# Patient Record
Sex: MALE | Race: WHITE | URBAN - METROPOLITAN AREA
[De-identification: names, ages, dates, MRNs, and addresses within clinical notes are randomized per-mention and may not be internally consistent; named-entity substitution may affect disease eponyms.]

---

## 2024-01-04 ENCOUNTER — OFFICE VISIT (OUTPATIENT)
Dept: URGENT CARE | Facility: CLINIC | Age: 39
End: 2024-01-04
Payer: COMMERCIAL

## 2024-01-04 VITALS
BODY MASS INDEX: 44.1 KG/M2 | OXYGEN SATURATION: 98 % | HEIGHT: 71 IN | RESPIRATION RATE: 16 BRPM | SYSTOLIC BLOOD PRESSURE: 138 MMHG | HEART RATE: 90 BPM | WEIGHT: 315 LBS | DIASTOLIC BLOOD PRESSURE: 89 MMHG | TEMPERATURE: 98.6 F

## 2024-01-04 DIAGNOSIS — R05.1 ACUTE COUGH: ICD-10-CM

## 2024-01-04 DIAGNOSIS — J06.9 ACUTE URI: Primary | ICD-10-CM

## 2024-01-04 PROCEDURE — 99212 OFFICE O/P EST SF 10 MIN: CPT | Mod: 25

## 2024-01-04 RX ORDER — BUPROPION HYDROCHLORIDE 150 MG/1
150 TABLET, EXTENDED RELEASE ORAL 2 TIMES DAILY
COMMUNITY
Start: 2023-06-07

## 2024-01-04 RX ORDER — AZITHROMYCIN 250 MG/1
TABLET, FILM COATED ORAL
Qty: 6 TABLET | Refills: 0 | Status: SHIPPED | OUTPATIENT
Start: 2024-01-04 | End: 2024-01-09

## 2024-01-04 RX ORDER — FLUOXETINE HYDROCHLORIDE 20 MG/1
40 CAPSULE ORAL
COMMUNITY
Start: 2022-06-06

## 2024-01-04 RX ORDER — PREDNISONE 10 MG/1
30 TABLET ORAL DAILY
Qty: 21 TABLET | Refills: 0 | Status: SHIPPED | OUTPATIENT
Start: 2024-01-04 | End: 2024-01-11

## 2024-01-04 NOTE — PROGRESS NOTES
[facility] URGENT CARE MADELINE NOTE:      Name: Jaswant Carter, 38 y.o.    CSN:[id number]   MRN:[id number]    PCP: No primary care provider on file.    ALL:  No Known Allergies    History:    Chief Complaint: Cough (COUGH FOR 1 WEEK)    Encounter Date: 1/4/2024      HPI: The history was obtained from the patient. Jaswant is a 38 y.o. male, who presents with a chief complaint of Cough (COUGH FOR 1 WEEK). States he went to urgent care on 12/31/23 and was given citirizine and and flonase. He felt like his symptoms improved for a couple days and on Wednesday woke up with congestion, cough, and sore throat which was worse than the initial bout. He denies fevers, N/V, headache, sob, chest pain, abdominal pain.    PMHx:    No past medical history on file.           Current Outpatient Medications   Medication Sig Dispense Refill    buPROPion SR (Wellbutrin SR) 150 mg 12 hr tablet Take 1 tablet (150 mg) by mouth twice a day.      FLUoxetine (PROzac) 20 mg capsule Take 2 capsules (40 mg) by mouth once daily.      azithromycin (Zithromax Z-Marvin) 250 mg tablet Take 2 tablets (500 mg) on  Day 1,  followed by 1 tablet (250 mg) once daily on Days 2 through 5. 6 tablet 0    predniSONE (Deltasone) 10 mg tablet Take 3 tablets (30 mg) by mouth once daily for 7 days. 21 tablet 0     No current facility-administered medications for this visit.         PMSx:  No past surgical history on file.    Fam Hx: No family history on file.    SOC. Hx:     Social History     Socioeconomic History    Marital status: Single     Spouse name: Not on file    Number of children: Not on file    Years of education: Not on file    Highest education level: Not on file   Occupational History    Not on file   Tobacco Use    Smoking status: Not on file    Smokeless tobacco: Not on file   Substance and Sexual Activity    Alcohol use: Not on file    Drug use: Not on file    Sexual activity: Not on file   Other Topics Concern    Not on file    Social History Narrative    Not on file     Social Determinants of Health     Financial Resource Strain: Not on file   Food Insecurity: Not on file   Transportation Needs: Not on file   Physical Activity: Not on file   Stress: Not on file   Social Connections: Not on file   Intimate Partner Violence: Not on file   Housing Stability: Not on file         Vitals:    01/04/24 1740   BP: 138/89   Pulse: 90   Resp: 16   Temp: 37 °C (98.6 °F)   SpO2: 98%     145 kg (320 lb)          Physical Exam  Constitutional:       Appearance: Normal appearance.   HENT:      Right Ear: Tympanic membrane normal.      Left Ear: Tympanic membrane normal.      Nose: Congestion present.      Mouth/Throat:      Mouth: Mucous membranes are moist.      Pharynx: Oropharynx is clear. Posterior oropharyngeal erythema present.   Eyes:      Conjunctiva/sclera: Conjunctivae normal.      Pupils: Pupils are equal, round, and reactive to light.   Cardiovascular:      Rate and Rhythm: Normal rate and regular rhythm.   Pulmonary:      Effort: Pulmonary effort is normal.      Breath sounds: Normal breath sounds.   Skin:     General: Skin is warm.   Neurological:      General: No focal deficit present.      Mental Status: He is alert and oriented to person, place, and time.   Psychiatric:         Mood and Affect: Mood normal.         Behavior: Behavior normal.         LABORATORY @ RADIOLOGICAL IMAGING (if done):     No results found for this or any previous visit (from the past 24 hour(s)).    UC COURSE/MEDICAL DECISION MAKING:    Jaswant is a 38 y.o., who presents with a working diagnosis of   1. Acute URI    2. Acute cough      Jaswant was seen today for cough.  Diagnoses and all orders for this visit:  Acute URI (Primary)  -     azithromycin (Zithromax Z-Marvin) 250 mg tablet; Take 2 tablets (500 mg) on  Day 1,  followed by 1 tablet (250 mg) once daily on Days 2 through 5.  Acute cough  -     predniSONE (Deltasone) 10 mg tablet; Take 3 tablets (30 mg) by mouth  once daily for 7 days.    After my independent evaluation, he appears to have a illness likely due to a URI. At this time, there is a no evidence of pneumonia, hypoxia, OM, bacterial bronchitis, bacteremia, or sepsis.     In my medical opinion, I consider Jaswant to be low risk for any serious/life-threatening entity, and deem him stable for discharge. This is based on the information that was available to me at the time of this visit.      As we discussed, he is to take the antibiotic as directed. Will also send in steroid burst for cough. He is to return to our office or ER immediately if there is any worsening of his condition, such as increased cough, shortness of breath, persistent fevers, repeated vomiting, dehydration, or if his condition worsens at all.      Katharine Willoughby PA-C   Advanced Practice Provider  [facility] URGENT CARE

## 2024-12-28 ENCOUNTER — OFFICE VISIT (OUTPATIENT)
Dept: URGENT CARE | Facility: CLINIC | Age: 39
End: 2024-12-28
Payer: COMMERCIAL

## 2024-12-28 VITALS
OXYGEN SATURATION: 97 % | BODY MASS INDEX: 32.9 KG/M2 | HEART RATE: 86 BPM | HEIGHT: 71 IN | RESPIRATION RATE: 16 BRPM | WEIGHT: 235 LBS | SYSTOLIC BLOOD PRESSURE: 124 MMHG | DIASTOLIC BLOOD PRESSURE: 78 MMHG | TEMPERATURE: 98.2 F

## 2024-12-28 DIAGNOSIS — R05.1 ACUTE COUGH: ICD-10-CM

## 2024-12-28 DIAGNOSIS — J10.1 INFLUENZA A: Primary | ICD-10-CM

## 2024-12-28 LAB
POC CORONAVIRUS 2019 BY PCR (COV19): NOT DETECTED
POC FLU A RESULT: DETECTED
POC FLU B RESULT: NOT DETECTED
POC RSV PCR: NOT DETECTED

## 2024-12-28 PROCEDURE — 99213 OFFICE O/P EST LOW 20 MIN: CPT | Performed by: NURSE PRACTITIONER

## 2024-12-28 RX ORDER — GUAIFENESIN, PSEUDOEPHEDRINE HYDROCHLORIDE 600; 60 MG/1; MG/1
1 TABLET, EXTENDED RELEASE ORAL EVERY 12 HOURS
Qty: 28 TABLET | Refills: 0 | Status: SHIPPED | OUTPATIENT
Start: 2024-12-28 | End: 2025-01-11

## 2024-12-28 RX ORDER — ALBUTEROL SULFATE 90 UG/1
2 INHALANT RESPIRATORY (INHALATION) EVERY 4 HOURS PRN
Qty: 18 G | Refills: 0 | Status: SHIPPED | OUTPATIENT
Start: 2024-12-28 | End: 2025-01-27

## 2024-12-28 RX ORDER — OSELTAMIVIR PHOSPHATE 75 MG/1
75 CAPSULE ORAL EVERY 12 HOURS
Qty: 10 CAPSULE | Refills: 0 | Status: SHIPPED | OUTPATIENT
Start: 2024-12-28 | End: 2025-01-02

## 2024-12-28 RX ORDER — BROMPHENIRAMINE MALEATE, PSEUDOEPHEDRINE HYDROCHLORIDE, AND DEXTROMETHORPHAN HYDROBROMIDE 2; 30; 10 MG/5ML; MG/5ML; MG/5ML
5 SYRUP ORAL 4 TIMES DAILY PRN
Qty: 120 ML | Refills: 0 | Status: SHIPPED | OUTPATIENT
Start: 2024-12-28 | End: 2025-01-07

## 2024-12-28 NOTE — LETTER
December 28, 2024     Patient: Jaswant Carter   YOB: 1985   Date of Visit: 12/28/2024       To Whom It May Concern:    It is my medical opinion that Jaswant Carter may return to work on 12/31/2024 for influenza A .    If you have any questions or concerns, please don't hesitate to call.         Sincerely,        SHANDA Xavier-CNP    CC: No Recipients

## 2024-12-28 NOTE — PROGRESS NOTES
[facility] URGENT CARE   MADELINE NOTE:      Name: Jaswant Carter, 39 y.o.    CSN:[id number]   MRN:[id number]    PCP: No primary care provider on file.    ALL:  No Known Allergies    History:    Chief Complaint: No chief complaint on file.    Encounter Date: 12/28/2024      HPI: The history was obtained from the patient. Jaswant is a 39 y.o. male, who presents with a chief complaint of No chief complaint on file.     Sinus pressure and pain, nasal congestion with clear mucus, bilateral ear pain/fullness, productive cough with yellow sputum, mild sore throat, fever, chills, body aches, and fatigue. Reports shortness of breath with activity, denies wheezing. symptoms began 1 day ago, reports symptoms have progressively worsened since onset. Denies any abdominal pain, chest pain, rashes, urinary symptoms, vomiting, and diarrhea. Denies any lightheadedness or dizziness; no changes in mental status. No swelling in legs. Appetite is decreased; is able to eat and drink fluids without difficulty.       PMHx:    No past medical history on file.           Current Outpatient Medications   Medication Sig Dispense Refill    albuterol 90 mcg/actuation inhaler Inhale 2 puffs every 4 hours if needed for wheezing or shortness of breath. 18 g 0    brompheniramine-pseudoeph-DM 2-30-10 mg/5 mL syrup Take 5 mL by mouth 4 times a day as needed for congestion or cough for up to 10 days. 120 mL 0    buPROPion SR (Wellbutrin SR) 150 mg 12 hr tablet Take 1 tablet (150 mg) by mouth twice a day.      FLUoxetine (PROzac) 20 mg capsule Take 2 capsules (40 mg) by mouth once daily.      oseltamivir (Tamiflu) 75 mg capsule Take 1 capsule (75 mg) by mouth every 12 hours for 5 days. 10 capsule 0    pseudoephedrine-guaifenesin (Mucinex D)  mg 12 hr tablet Take 1 tablet by mouth every 12 hours for 14 days. 28 tablet 0     No current facility-administered medications for this visit.         PMSx:  No past surgical history on  file.    Fam Hx: No family history on file.    SOC. Hx:     Social History     Socioeconomic History    Marital status: Single     Spouse name: Not on file    Number of children: Not on file    Years of education: Not on file    Highest education level: Not on file   Occupational History    Not on file   Tobacco Use    Smoking status: Not on file    Smokeless tobacco: Not on file   Substance and Sexual Activity    Alcohol use: Not on file    Drug use: Not on file    Sexual activity: Not on file   Other Topics Concern    Not on file   Social History Narrative    Not on file     Social Drivers of Health     Financial Resource Strain: Patient Declined (2/15/2023)    Received from [facility]    Overall Financial Resource Strain (CARDIA)     Difficulty of Paying Living Expenses: Patient declined   Food Insecurity: No Food Insecurity (2/15/2023)    Received from [facility]    Hunger Vital Sign     Worried About Running Out of Food in the Last Year: Never true     Ran Out of Food in the Last Year: Never true   Transportation Needs: No Transportation Needs (2/15/2023)    Received from [facility]    PRAPARE - Transportation     Lack of Transportation (Medical): No     Lack of Transportation (Non-Medical): No   Physical Activity: Unknown (2/15/2023)    Received from [facility]    Exercise Vital Sign     Days of Exercise per Week: Patient declined     Minutes of Exercise per Session: Not on file   Stress: Stress Concern Present (2/15/2023)    Received from [facility]    [place] [employer] - Occupational Stress Questionnaire     Feeling of Stress : Rather much   Social Connections: Moderately Integrated (2/15/2023)    Received from [facility]    Social Connection and Isolation Panel [NHANES]     Frequency of Communication with Friends and Family: More than three times a week     Frequency of Social Gatherings with Friends and Family:  Once a week     Attends [employer] Services: 1 to 4 times per year     Active Member of Clubs or Organizations: No     Attends Club or Organization Meetings: Patient declined     Marital Status:    Intimate Partner Violence: Not At Risk (2/15/2023)    Received from [facility]    Humiliation, Afraid, Rape, and Kick questionnaire     Fear of Current or Ex-Partner: No     Emotionally Abused: No     Physically Abused: No     Sexually Abused: No   Housing Stability: Low Risk  (2/15/2023)    Received from [facility]    Housing Stability Vital Sign     Unable to Pay for Housing in the Last Year: No     Number of Places Lived in the Last Year: 1     Unstable Housing in the Last Year: No         Vitals:    12/28/24 1130   BP: 124/78   Pulse: 86   Resp: 16   Temp: 36.8 °C (98.2 °F)   SpO2: 97%     107 kg (235 lb)          Physical Exam  Vitals and nursing note reviewed.   Constitutional:       General: He is not in acute distress.     Appearance: Normal appearance.   HENT:      Head: Normocephalic and atraumatic.      Comments: Bilateral bogginess with tenderness, and mild edema     Right Ear: Hearing, ear canal and external ear normal. A middle ear effusion is present. Tympanic membrane is erythematous. Tympanic membrane is not scarred, retracted or bulging. Tympanic membrane has normal mobility.      Left Ear: Hearing, ear canal and external ear normal. A middle ear effusion is present. Tympanic membrane is erythematous. Tympanic membrane is not scarred, retracted or bulging. Tympanic membrane has normal mobility.      Nose: Congestion and rhinorrhea present. Rhinorrhea is purulent.      Right Sinus: Maxillary sinus tenderness and frontal sinus tenderness present.      Left Sinus: Maxillary sinus tenderness and frontal sinus tenderness present.      Mouth/Throat:      Lips: Pink.      Mouth: Mucous membranes are moist.      Pharynx: Uvula midline. Posterior oropharyngeal erythema present. No  pharyngeal swelling or oropharyngeal exudate.      Tonsils: No tonsillar exudate.   Cardiovascular:      Rate and Rhythm: Normal rate and regular rhythm.      Heart sounds: Normal heart sounds.   Pulmonary:      Effort: Pulmonary effort is normal. No respiratory distress.      Breath sounds: Normal breath sounds. No stridor. No wheezing, rhonchi or rales.   Chest:      Chest wall: No tenderness.   Abdominal:      General: Bowel sounds are normal.   Skin:     General: Skin is warm and dry.      Capillary Refill: Capillary refill takes less than 2 seconds.   Neurological:      Mental Status: He is alert and oriented to person, place, and time.           LABORATORY @ RADIOLOGICAL IMAGING (if done):     Results for orders placed or performed in visit on 12/28/24 (from the past 24 hours)   POCT SARS-COV-2/FLU/RSV PCR SYMPTOMATIC manually resulted   Result Value Ref Range    POC Coronavirus 2019, PCR Not Detected Not Detected    POC Flu A Result Detected (A) Not Detected    POC Flu B Result Not Detected Not Detected    POC RSV PCR Not Detected Not Detected     Positive influenza A results given to patient, would like to utilize Tamiflu  ____________________________________________________________________    I did personally review Jaswant's past medical history, surgical history, social history, as well as family history (when relevant).  In this case, I also oversaw the his drug management by reviewing his medication list, allergy list, as well as the medications that I prescribed during the UC course and/or recommended as an out-patient (including possible OTC medications such as acetaminophen, NSAIDs , etc).    After reviewing the items above, I did look at previous medical documentation, such as recent hospitalizations, office visits, and/or recent consultations with PCP/specialist.                          SDOH:   Another factor that I considered in Jaswant's care was his Social Determinants of Health (SDOH). During this  UC encounter, he did not have social determinants of health. Those SDOH influencing Jaswant's care are: none      _____________________________________________________________________       COURSE/MEDICAL DECISION MAKING:    Jaswant is a 39 y.o., who presents with a working diagnosis of   1. Influenza A    2. Acute cough        Diagnoses and all orders for this visit:  Influenza A (Primary)  -     oseltamivir (Tamiflu) 75 mg capsule; Take 1 capsule (75 mg) by mouth every 12 hours for 5 days.  Acute cough  -     brompheniramine-pseudoeph-DM 2-30-10 mg/5 mL syrup; Take 5 mL by mouth 4 times a day as needed for congestion or cough for up to 10 days.  -     pseudoephedrine-guaifenesin (Mucinex D)  mg 12 hr tablet; Take 1 tablet by mouth every 12 hours for 14 days.  -     POCT SARS-COV-2/FLU/RSV PCR SYMPTOMATIC manually resulted  -     albuterol 90 mcg/actuation inhaler; Inhale 2 puffs every 4 hours if needed for wheezing or shortness of breath.     Supportive care recommended, discussed treatment at bedside to use Tamiflu as a measure to reduce overall symptoms, she was agreed with this plan we discussed side effects associated with this medication and it was sent electronically to her pharmacy.     Plan of care reviewed with patient.  If symptoms change and/or worsen will follow-up with primary care provider, return to urgent care, or go to the nearest emergency department for further evaluation.  Patient states understanding and is agreeable with plan of care.      SHANDA Parker, DNP   Advanced Practice Provider  [facility] URGENT CARE    Please note: While the patient may or may not have received printed discharge paperwork, all relevant medical findings, test results, and treatment details are accessible through the electronic medical record system. The patient is encouraged to review their chart via the patient portal for comprehensive information and follow-up instructions.

## 2025-09-02 ENCOUNTER — OFFICE VISIT (OUTPATIENT)
Dept: URGENT CARE | Facility: CLINIC | Age: 40
End: 2025-09-02
Payer: COMMERCIAL

## 2025-09-02 VITALS
OXYGEN SATURATION: 97 % | WEIGHT: 300 LBS | BODY MASS INDEX: 42 KG/M2 | SYSTOLIC BLOOD PRESSURE: 121 MMHG | TEMPERATURE: 97.8 F | HEART RATE: 70 BPM | RESPIRATION RATE: 16 BRPM | HEIGHT: 71 IN | DIASTOLIC BLOOD PRESSURE: 90 MMHG

## 2025-09-02 DIAGNOSIS — L23.7 ALLERGIC CONTACT DERMATITIS DUE TO PLANTS, EXCEPT FOOD: Primary | ICD-10-CM

## 2025-09-02 PROCEDURE — 99213 OFFICE O/P EST LOW 20 MIN: CPT | Mod: 25 | Performed by: PHYSICIAN ASSISTANT

## 2025-09-02 PROCEDURE — 2500000004 HC RX 250 GENERAL PHARMACY W/ HCPCS (ALT 636 FOR OP/ED): Mod: JZ | Performed by: PHYSICIAN ASSISTANT

## 2025-09-02 PROCEDURE — 96372 THER/PROPH/DIAG INJ SC/IM: CPT | Performed by: PHYSICIAN ASSISTANT

## 2025-09-02 RX ORDER — ARIPIPRAZOLE 5 MG/1
5 TABLET ORAL
COMMUNITY
Start: 2024-10-14

## 2025-09-02 RX ORDER — TRIAMCINOLONE ACETONIDE 1 MG/G
OINTMENT TOPICAL 2 TIMES DAILY
Qty: 80 G | Refills: 0 | Status: SHIPPED | OUTPATIENT
Start: 2025-09-02

## 2025-09-02 RX ORDER — METHYLPREDNISOLONE SODIUM SUCCINATE 125 MG/2ML
62.5 INJECTION INTRAMUSCULAR; INTRAVENOUS ONCE
Status: COMPLETED | OUTPATIENT
Start: 2025-09-02 | End: 2025-09-02

## 2025-09-02 RX ORDER — PREDNISONE 50 MG/1
50 TABLET ORAL DAILY
Qty: 7 TABLET | Refills: 0 | Status: SHIPPED | OUTPATIENT
Start: 2025-09-02 | End: 2025-09-09

## 2025-09-02 RX ORDER — METHYLPREDNISOLONE ACETATE 80 MG/ML
40 INJECTION, SUSPENSION INTRA-ARTICULAR; INTRALESIONAL; INTRAMUSCULAR; SOFT TISSUE ONCE
Status: COMPLETED | OUTPATIENT
Start: 2025-09-02 | End: 2025-09-02

## 2025-09-02 RX ADMIN — METHYLPREDNISOLONE SODIUM SUCCINATE 62.5 MG: 125 INJECTION, POWDER, FOR SOLUTION INTRAMUSCULAR; INTRAVENOUS at 10:19

## 2025-09-02 RX ADMIN — METHYLPREDNISOLONE ACETATE 40 MG: 80 INJECTION, SUSPENSION INTRA-ARTICULAR; INTRALESIONAL; INTRAMUSCULAR; SOFT TISSUE at 10:16

## 2025-11-07 ENCOUNTER — APPOINTMENT (OUTPATIENT)
Dept: PRIMARY CARE | Facility: CLINIC | Age: 40
End: 2025-11-07
Payer: COMMERCIAL